# Patient Record
Sex: MALE | Race: BLACK OR AFRICAN AMERICAN | Employment: FULL TIME | URBAN - METROPOLITAN AREA
[De-identification: names, ages, dates, MRNs, and addresses within clinical notes are randomized per-mention and may not be internally consistent; named-entity substitution may affect disease eponyms.]

---

## 2024-04-07 ENCOUNTER — HOSPITAL ENCOUNTER (EMERGENCY)
Age: 59
Discharge: HOME | End: 2024-04-07
Payer: COMMERCIAL

## 2024-04-07 VITALS
RESPIRATION RATE: 16 BRPM | SYSTOLIC BLOOD PRESSURE: 161 MMHG | TEMPERATURE: 98.2 F | BODY MASS INDEX: 26.56 KG/M2 | HEIGHT: 74 IN | WEIGHT: 207 LBS | DIASTOLIC BLOOD PRESSURE: 101 MMHG | OXYGEN SATURATION: 99 % | HEART RATE: 86 BPM

## 2024-04-07 DIAGNOSIS — Z71.1 FEARED COMPLAINT WITHOUT DIAGNOSIS: Primary | ICD-10-CM

## 2024-04-07 PROCEDURE — 99282 EMERGENCY DEPT VISIT SF MDM: CPT

## 2024-04-07 ASSESSMENT — PAIN - FUNCTIONAL ASSESSMENT: PAIN_FUNCTIONAL_ASSESSMENT: NONE - DENIES PAIN

## 2024-04-08 NOTE — DISCHARGE INSTR - COC
Continuity of Care Form    Patient Name: Jose Fontaine   :  1965  MRN:  [id number]    Admit date:  2024  Discharge date:  ***    Code Status Order: No Order   Advance Directives:     Admitting Physician:  No admitting provider for patient encounter.  PCP: No primary care provider on file.    Discharging Nurse: ***  Discharging Hospital Unit/Room#: 34/34  Discharging Unit Phone Number: ***    Emergency Contact:   Extended Emergency Contact Information  Primary Emergency Contact: Angeline Neely  Mobile Phone: [phone]  Relation: Girlfriend   needed? No    Past Surgical History:  No past surgical history on file.    Immunization History:     There is no immunization history on file for this patient.    Active Problems:  There is no problem list on file for this patient.      Isolation/Infection:   Isolation            No Isolation          Patient Infection Status       None to display            Nurse Assessment:  Last Vital Signs: BP (!) 161/101   Pulse 86   Temp 98.2 °F (36.8 °C) (Temporal)   Resp 16   Ht 1.88 m (6' 2\")   Wt 93.9 kg (207 lb)   SpO2 99%   BMI 26.58 kg/m²     Last documented pain score (0-10 scale):    Last Weight:   Wt Readings from Last 1 Encounters:   24 93.9 kg (207 lb)     Mental Status:  {IP PT MENTAL STATUS:}    IV Access:  { TIFFANY IV ACCESS:350351820}    Nursing Mobility/ADLs:  Walking   {CHP DME ADLs:909245248}  Transfer  {CHP DME ADLs:358287067}  Bathing  {CHP DME ADLs:532087665}  Dressing  {CHP DME ADLs:427925240}  Toileting  {CHP DME ADLs:975482197}  Feeding  {CHP DME ADLs:437806721}  Med Admin  {CHP DME ADLs:576944571}  Med Delivery   { TIFFANY MED Delivery:855290061}    Wound Care Documentation and Therapy:        Elimination:  Continence:   Bowel: {YES / NO:}  Bladder: {YES / NO:}  Urinary Catheter: {Urinary Catheter:978190189}   Colostomy/Ileostomy/Ileal Conduit: {YES / NO:}       Date of Last BM: ***  No intake or output

## 2024-04-09 NOTE — ED PROVIDER NOTES
male who presents to the ED by private vehicle for concern that he may have swallowed his Butrans buccal film, beginning 1 hour(s) ago. The complaint has been persistent and are non in severity.  Pt reports he dropped it in the sink at first and then put it in his mouth.  He reports it was already starting to sound when he put it in his mouth.  He reports he got sidetracked and was talking to his girlfriend when realized it was not in his mouth anymore and he is concerned that he swallowed it.  He denies dizziness, somnolence, headache, shortness of breath, fatigue, confusion.  He reports he had been on the patch but was having her skin reaction to the patch and was started on the buccal films.  He has been taking them for just a few days.    Patient is asymptomatic at this time.  He is alert and oriented, heart regular rate and rhythm, lungs clear to auscultation bilaterally.  Advised patient that the worst it could happen is he will get less medication than he is used to but he has only been on these medications for a short time and this is unlikely to cause any problems. Pt stable for discharge.    Plan of Care/Counseling:  Ute Blue PA-C reviewed today's visit with the patient in addition to providing specific details for the plan of care and counseling regarding the diagnosis and prognosis.  Questions are answered at this time and are agreeable with the plan.    ASSESSMENT     1. Feared complaint without diagnosis        DISPOSITION   Discharged home.  Patient condition is good    Discharge Instructions:   Patient referred to  [facility]  [address] [address]  [phone]    If you develop any symptoms    NEW MEDICATIONS   There are no discharge medications for this patient.    Electronically signed by Ute Blue PA-C   DD: 4/8/24  **This report was transcribed using voice recognition software. Every effort was made to ensure